# Patient Record
Sex: FEMALE | Race: WHITE | URBAN - METROPOLITAN AREA
[De-identification: names, ages, dates, MRNs, and addresses within clinical notes are randomized per-mention and may not be internally consistent; named-entity substitution may affect disease eponyms.]

---

## 2018-05-31 ENCOUNTER — DOCTOR'S OFFICE (OUTPATIENT)
Dept: URBAN - METROPOLITAN AREA CLINIC 136 | Facility: CLINIC | Age: 28
Setting detail: OPHTHALMOLOGY
End: 2018-05-31
Payer: COMMERCIAL

## 2018-05-31 DIAGNOSIS — H33.021: ICD-10-CM

## 2018-05-31 DIAGNOSIS — H53.19: ICD-10-CM

## 2018-05-31 DIAGNOSIS — H35.412: ICD-10-CM

## 2018-05-31 PROCEDURE — 92004 COMPRE OPH EXAM NEW PT 1/>: CPT | Performed by: OPHTHALMOLOGY

## 2018-05-31 ASSESSMENT — CONFRONTATIONAL VISUAL FIELD TEST (CVF)
OD_FINDINGS: FULL
OS_FINDINGS: FULL

## 2018-06-04 PROBLEM — H35.413 LATTICE DEGENERATION; BOTH EYES: Status: ACTIVE | Noted: 2018-05-31

## 2018-06-04 ASSESSMENT — REFRACTION_CURRENTRX
OS_OVR_VA: 20/
OD_SPHERE: -8.50
OD_OVR_VA: 20/
OD_OVR_VA: 20/
OS_CYLINDER: -0.75
OS_OVR_VA: 20/
OS_AXIS: 150
OS_OVR_VA: 20/
OD_AXIS: 010
OS_SPHERE: -7.00
OD_CYLINDER: -0.50
OD_OVR_VA: 20/

## 2018-06-04 ASSESSMENT — REFRACTION_MANIFEST
OS_VA2: 20/
OD_VA1: 20/
OS_VA2: 20/
OU_VA: 20/
OD_VA1: 20/
OD_VA2: 20/
OD_VA2: 20/
OU_VA: 20/
OU_VA: 20/
OS_VA1: 20/
OS_VA2: 20/
OD_VA2: 20/
OS_VA3: 20/
OD_VA3: 20/
OS_VA3: 20/
OS_VA1: 20/
OS_VA1: 20/
OS_VA3: 20/
OD_VA1: 20/
OD_VA3: 20/
OD_VA3: 20/

## 2018-06-04 ASSESSMENT — VISUAL ACUITY
OD_BCVA: 20/25
OS_BCVA: 20/30+1

## 2020-03-23 ENCOUNTER — TRANSCRIBE ORDERS (OUTPATIENT)
Dept: PERINATAL CARE | Facility: CLINIC | Age: 30
End: 2020-03-23

## 2020-03-23 DIAGNOSIS — O09.899 SUPERVISION OF OTHER HIGH RISK PREGNANCIES, UNSPECIFIED TRIMESTER: Primary | ICD-10-CM

## 2020-03-24 ENCOUNTER — TELEPHONE (OUTPATIENT)
Dept: PERINATAL CARE | Facility: OTHER | Age: 30
End: 2020-03-24

## 2020-03-24 NOTE — TELEPHONE ENCOUNTER
[facility] Telephone Note:    Spoke with pt and confirmed appointment with [facility]  Pt advised of new visitor policy allowing NO support persons for appointment  Patient also informed of phone check in process  She offers no questions  PT also screened for COVID19      Cough: No  Fever: No  Shortness of breath:No   Known exposures to COVID-19, or international travel: No

## 2020-03-25 ENCOUNTER — ROUTINE PRENATAL (OUTPATIENT)
Dept: PERINATAL CARE | Facility: OTHER | Age: 30
End: 2020-03-25
Payer: COMMERCIAL

## 2020-03-25 VITALS — WEIGHT: 168.43 LBS

## 2020-03-25 DIAGNOSIS — O09.899 SUPERVISION OF OTHER HIGH RISK PREGNANCIES, UNSPECIFIED TRIMESTER: ICD-10-CM

## 2020-03-25 DIAGNOSIS — O99.810 ABNORMAL GLUCOSE TOLERANCE AFFECTING PREGNANCY, ANTEPARTUM: ICD-10-CM

## 2020-03-25 DIAGNOSIS — Z36.89 ENCOUNTER FOR FETAL ANATOMIC SURVEY: ICD-10-CM

## 2020-03-25 DIAGNOSIS — Z3A.34 34 WEEKS GESTATION OF PREGNANCY: Primary | ICD-10-CM

## 2020-03-25 PROBLEM — J45.909 ASTHMA AFFECTING PREGNANCY IN THIRD TRIMESTER: Status: ACTIVE | Noted: 2020-03-25

## 2020-03-25 PROBLEM — O99.513 ASTHMA AFFECTING PREGNANCY IN THIRD TRIMESTER: Status: ACTIVE | Noted: 2020-03-25

## 2020-03-25 PROBLEM — O09.819 PREGNANCY CONCEIVED THROUGH IN VITRO FERTILIZATION: Status: ACTIVE | Noted: 2020-03-25

## 2020-03-25 PROCEDURE — 76805 OB US >/= 14 WKS SNGL FETUS: CPT | Performed by: OBSTETRICS & GYNECOLOGY

## 2020-03-25 PROCEDURE — 99442 PR PHYS/QHP TELEPHONE EVALUATION 11-20 MIN: CPT | Performed by: OBSTETRICS & GYNECOLOGY

## 2020-03-25 RX ORDER — LORATADINE 10 MG/1
10 TABLET ORAL DAILY
COMMUNITY

## 2020-03-25 RX ORDER — MONTELUKAST SODIUM 10 MG/1
10 TABLET ORAL
COMMUNITY

## 2020-03-25 RX ORDER — ALBUTEROL SULFATE 90 UG/1
2 AEROSOL, METERED RESPIRATORY (INHALATION) EVERY 6 HOURS PRN
COMMUNITY

## 2020-03-25 NOTE — PROGRESS NOTES
Virtual Regular Visit    Problem List Items Addressed This Visit        Other    34 weeks gestation of pregnancy - Primary     We reviewed reassuring ultrasound findings suggesting no microcephaly  The inherent error and limitations of third trimester ultrasound were reviewed  Routine prenatal care is recommended  Abnormal glucose tolerance affecting pregnancy, antepartum      Other Visit Diagnoses     Supervision of other high risk pregnancies, unspecified trimester        Encounter for fetal anatomic survey            Reason for visit is: evaluation of fetal growth, concern for microcephaly  Encounter provider Keith Sandoval MD    Provider located at [address] [address]      Recent Visits  Date Type Provider Dept   20 Telephone Sabino Yeboah RN An  Shree Ringer recent visits within past 7 days and meeting all other requirements     Future Appointments  No visits were found meeting these conditions  Showing future appointments within next 150 days and meeting all other requirements        After connecting through [employer], the patient was identified by name and date of birth  Michelet Noonan was informed that this is a telemedicine visit and that the visit is being conducted through [employer]? which may not be secure and therefore, might not be HIPAA-compliant  My office door was closed  The following individuals were in the room with me and the patient informed Gi Clarke, Homer Mcclendon  She acknowledged consent and understanding of privacy and security of the video platform  The patient has agreed to participate and understands they can discontinue the visit at any time  Subjective  Michelet Noonan is a 26 y o  female  at 34w7d, presenting for evaluation of fetal growth  There was concern for microcephaly by routine office ultrasound       Past Medical History:   Diagnosis Date  Asthma     allergy induced    In vitro fertilization     Polycystic ovary syndrome        Past Surgical History:   Procedure Laterality Date    EYE SURGERY      retina surgery       Current Outpatient Medications   Medication Sig Dispense Refill    albuterol (PROVENTIL HFA,VENTOLIN HFA) 90 mcg/act inhaler Inhale 2 puffs every 6 (six) hours as needed for wheezing      Ferrous Gluconate (IRON 27 PO) Take 1 tablet by mouth      loratadine (Claritin) 10 mg tablet Take 10 mg by mouth daily      montelukast (SINGULAIR) 10 mg tablet Take 10 mg by mouth daily at bedtime      Prenatal Vit-Fe Fumarate-FA (PRENATAL 1+1 PO) Take 1 tablet by mouth daily       No current facility-administered medications for this visit  OB History    Para Term  AB Living   1             SAB TAB Ectopic Multiple Live Births                  # Outcome Date GA Lbr Oleksandr/2nd Weight Sex Delivery Anes PTL Lv   1 Current                Social History     Tobacco Use    Smoking status: Never Smoker    Smokeless tobacco: Never Used   Substance Use Topics    Alcohol use: Not Currently     Frequency: Never    Drug use: Never      Family History   Problem Relation Age of Onset    No Known Problems Mother     No Known Problems Father     No Known Problems Sister     No Known Problems Brother     No Known Problems Maternal Grandmother     No Known Problems Maternal Grandfather     No Known Problems Paternal Grandmother     No Known Problems Paternal Grandfather    Family history is negative for congenital anomalies, genetic diseases, or developmental delays  [facility] ultrasound report key findings: Ultrasound findings today are as follows: There is a single live intrauterine pregnancy with biometry consistent with 34 weeks 6 days gestation  EDC was updated in Pregnancy Episode  No gross anomalies were identified on limited views  Amniotic fluid is within normal limits    I conveyed these findings above to the patient  Assessment/Plan  Ms Noonan is a 26y o  year-old  at 34w7d gestation  Problem List Items Addressed This Visit        Other    34 weeks gestation of pregnancy - Primary     We reviewed reassuring ultrasound findings suggesting no microcephaly  The inherent error and limitations of third trimester ultrasound were reviewed  Routine prenatal care is recommended  Abnormal glucose tolerance affecting pregnancy, antepartum      Other Visit Diagnoses     Supervision of other high risk pregnancies, unspecified trimester        Encounter for fetal anatomic survey               No Known Allergies    Review of Systems: Negative    Physical Exam: Well appearing pregnant female, in no distress  I spent 15 minutes with the patient during this visit

## 2020-03-25 NOTE — ASSESSMENT & PLAN NOTE
We reviewed reassuring ultrasound findings suggesting no microcephaly  The inherent error and limitations of third trimester ultrasound were reviewed  Routine prenatal care is recommended

## 2020-03-25 NOTE — LETTER
2020     Fernando Malik MD  [place] [address]  [address]  [facility] [address]    Patient: Usha Noonan   YOB: 1990   Date of Visit: 3/25/2020       Dear Dr Radha Lewis: Thank you for referring Usha Noonan to me for evaluation  Below are my notes for this consultation  If you have questions, please do not hesitate to call me  I look forward to following your patient along with you  Sincerely,        Dereck Sutherland MD        CC: No Recipients  Dereck Sutherland MD  3/25/2020  5:03 PM  Sign at close encounter  Virtual Regular Visit    Problem List Items Addressed This Visit        Other    34 weeks gestation of pregnancy - Primary     We reviewed reassuring ultrasound findings suggesting no microcephaly  The inherent error and limitations of third trimester ultrasound were reviewed  Routine prenatal care is recommended  Abnormal glucose tolerance affecting pregnancy, antepartum      Other Visit Diagnoses     Supervision of other high risk pregnancies, unspecified trimester        Encounter for fetal anatomic survey            Reason for visit is: evaluation of fetal growth, concern for microcephaly  Encounter provider Dereck Sutherland MD    Provider located at [address] [address]      Recent Visits  Date Type Provider Dept   20 Telephone Lavon Belle RN An  Lisa Crews recent visits within past 7 days and meeting all other requirements     Future Appointments  No visits were found meeting these conditions  Showing future appointments within next 150 days and meeting all other requirements        After connecting through [employer], the patient was identified by name and date of birth   Usha Noonan was informed that this is a telemedicine visit and that the visit is being conducted through [employer] which may not be secure and therefore, might not be HIPAA-compliant  My office door was closed  The following individuals were in the room with me and the patient informed Ketan Jennings, [address] Mcclendon  She acknowledged consent and understanding of privacy and security of the video platform  The patient has agreed to participate and understands they can discontinue the visit at any time  Subjective  Jeremi Landaverde Shaista is a 26 y o  female  at 34w7d, presenting for evaluation of fetal growth  There was concern for microcephaly by routine office ultrasound  Past Medical History:   Diagnosis Date    Asthma     allergy induced    In vitro fertilization     Polycystic ovary syndrome        Past Surgical History:   Procedure Laterality Date    EYE SURGERY      retina surgery       Current Outpatient Medications   Medication Sig Dispense Refill    albuterol (PROVENTIL HFA,VENTOLIN HFA) 90 mcg/act inhaler Inhale 2 puffs every 6 (six) hours as needed for wheezing      Ferrous Gluconate (IRON 27 PO) Take 1 tablet by mouth      loratadine (Claritin) 10 mg tablet Take 10 mg by mouth daily      montelukast (SINGULAIR) 10 mg tablet Take 10 mg by mouth daily at bedtime      Prenatal Vit-Fe Fumarate-FA (PRENATAL 1+1 PO) Take 1 tablet by mouth daily       No current facility-administered medications for this visit        OB History    Para Term  AB Living   1             SAB TAB Ectopic Multiple Live Births                  # Outcome Date GA Lbr Oleksandr/2nd Weight Sex Delivery Anes PTL Lv   1 Current                Social History     Tobacco Use    Smoking status: Never Smoker    Smokeless tobacco: Never Used   Substance Use Topics    Alcohol use: Not Currently     Frequency: Never    Drug use: Never      Family History   Problem Relation Age of Onset    No Known Problems Mother     No Known Problems Father     No Known Problems Sister     No Known Problems Brother     No Known Problems Maternal Grandmother     No Known Problems Maternal Grandfather     No Known Problems Paternal Grandmother     No Known Problems Paternal Grandfather    Family history is negative for congenital anomalies, genetic diseases, or developmental delays  [facility] ultrasound report key findings: Ultrasound findings today are as follows: There is a single live intrauterine pregnancy with biometry consistent with 34 weeks 6 days gestation  EDC was updated in Pregnancy Episode  No gross anomalies were identified on limited views  Amniotic fluid is within normal limits  I conveyed these findings above to the patient  Assessment/Plan  Ms Noonan is a 26y o  year-old  at 34w7d gestation  Problem List Items Addressed This Visit        Other    34 weeks gestation of pregnancy - Primary     We reviewed reassuring ultrasound findings suggesting no microcephaly  The inherent error and limitations of third trimester ultrasound were reviewed  Routine prenatal care is recommended  Abnormal glucose tolerance affecting pregnancy, antepartum      Other Visit Diagnoses     Supervision of other high risk pregnancies, unspecified trimester        Encounter for fetal anatomic survey               No Known Allergies    Review of Systems: Negative    Physical Exam: Well appearing pregnant female, in no distress  I spent 15 minutes with the patient during this visit